# Patient Record
Sex: MALE | URBAN - NONMETROPOLITAN AREA
[De-identification: names, ages, dates, MRNs, and addresses within clinical notes are randomized per-mention and may not be internally consistent; named-entity substitution may affect disease eponyms.]

---

## 2023-08-14 ENCOUNTER — TELEPHONE (OUTPATIENT)
Dept: NEUROLOGY | Age: 53
End: 2023-08-14

## 2023-08-14 NOTE — TELEPHONE ENCOUNTER
Tried to reach patient to reschedule their appointment with Dr. Abbie Herndon, he will not be in the office. Left a voicemail with rescheduled appointment time and date with a call back number if appointment wont work.

## 2023-10-12 ENCOUNTER — OFFICE VISIT (OUTPATIENT)
Dept: NEUROLOGY | Age: 53
End: 2023-10-12
Payer: COMMERCIAL

## 2023-10-12 VITALS
DIASTOLIC BLOOD PRESSURE: 73 MMHG | BODY MASS INDEX: 28 KG/M2 | HEART RATE: 66 BPM | SYSTOLIC BLOOD PRESSURE: 116 MMHG | WEIGHT: 200 LBS | HEIGHT: 71 IN | OXYGEN SATURATION: 99 %

## 2023-10-12 DIAGNOSIS — R25.1 TREMOR: Primary | ICD-10-CM

## 2023-10-12 PROCEDURE — 99204 OFFICE O/P NEW MOD 45 MIN: CPT | Performed by: PSYCHIATRY & NEUROLOGY

## 2023-10-12 RX ORDER — DICLOFENAC SODIUM 75 MG/1
75 TABLET, DELAYED RELEASE ORAL 2 TIMES DAILY
COMMUNITY
Start: 2023-09-23 | End: 2023-10-12

## 2023-10-12 RX ORDER — HYDROCODONE BITARTRATE AND ACETAMINOPHEN 5; 325 MG/1; MG/1
TABLET ORAL
COMMUNITY
Start: 2023-07-25 | End: 2023-10-12

## 2023-10-12 RX ORDER — SIMVASTATIN 20 MG
20 TABLET ORAL DAILY
COMMUNITY
Start: 2023-09-25

## 2023-10-12 RX ORDER — DOXYCYCLINE 100 MG/1
CAPSULE ORAL
COMMUNITY
Start: 2023-07-25

## 2023-10-12 NOTE — PROGRESS NOTES
Chief Complaint   Patient presents with    New Patient    Tremors       Jacky Plasencia is a 48y.o. year old male who is seen for evaluation of tremor. The patient indicates that he noticed tremor in the right and right leg for about 5 years. This is worsened slightly. Certain activities can make it worse. There is a family history of brain his mother and grandfather. His mother did have his sole tremor and did ultimately undergo deep brain stimulator implantation. The patient denies any balance issues. He works as a /[employer]. The tremor does not affect his job. Active Ambulatory Problems     Diagnosis Date Noted    Tremor 10/12/2023     Resolved Ambulatory Problems     Diagnosis Date Noted    No Resolved Ambulatory Problems     Past Medical History:   Diagnosis Date    Low back pain     Mixed hyperlipidemia     Neck pain     Sciatica        Past Surgical History:   Procedure Laterality Date    CARPAL TUNNEL RELEASE         No family history on file. No Known Allergies    Social History     Socioeconomic History    Marital status:      Spouse name: Not on file    Number of children: Not on file    Years of education: Not on file    Highest education level: Not on file   Occupational History    Not on file   Tobacco Use    Smoking status: Former     Types: Cigarettes     Passive exposure: Never    Smokeless tobacco: Never   Substance and Sexual Activity    Alcohol use: Not Currently    Drug use: Never    Sexual activity: Not on file   Other Topics Concern    Not on file   Social History Narrative    Not on file     Social Determinants of Health     Financial Resource Strain: Not on file   Food Insecurity: Not on file   Transportation Needs: Not on file   Physical Activity: Not on file   Stress: Not on file   Social Connections: Not on file   Intimate Partner Violence: Not on file   Housing Stability: Not on file       Review of Systems     Constitutional - No fever or chills.   No